# Patient Record
Sex: FEMALE | Race: ASIAN | NOT HISPANIC OR LATINO | URBAN - METROPOLITAN AREA
[De-identification: names, ages, dates, MRNs, and addresses within clinical notes are randomized per-mention and may not be internally consistent; named-entity substitution may affect disease eponyms.]

---

## 2022-03-13 ENCOUNTER — HOSPITAL ENCOUNTER (EMERGENCY)
Facility: HOSPITAL | Age: 67
Discharge: HOME | End: 2022-03-14
Attending: EMERGENCY MEDICINE | Admitting: EMERGENCY MEDICINE

## 2022-03-13 ENCOUNTER — APPOINTMENT (OUTPATIENT)
Dept: CT IMAGING | Facility: HOSPITAL | Age: 67
End: 2022-03-13

## 2022-03-13 DIAGNOSIS — N20.1 RIGHT URETERAL STONE: Primary | ICD-10-CM

## 2022-03-13 LAB
ALBUMIN SERPL-MCNC: 4.1 G/DL (ref 3.5–5.2)
ALBUMIN/GLOB SERPL: 1.3 G/DL
ALP SERPL-CCNC: 106 U/L (ref 39–117)
ALT SERPL W P-5'-P-CCNC: 15 U/L (ref 1–33)
ANION GAP SERPL CALCULATED.3IONS-SCNC: 12 MMOL/L (ref 5–15)
AST SERPL-CCNC: 18 U/L (ref 1–32)
BACTERIA UR QL AUTO: ABNORMAL /HPF
BASOPHILS # BLD AUTO: 0 10*3/MM3 (ref 0–0.2)
BASOPHILS NFR BLD AUTO: 0.3 % (ref 0–1.5)
BILIRUB SERPL-MCNC: 0.3 MG/DL (ref 0–1.2)
BILIRUB UR QL STRIP: NEGATIVE
BUN SERPL-MCNC: 15 MG/DL (ref 8–23)
BUN/CREAT SERPL: 27.8 (ref 7–25)
CALCIUM SPEC-SCNC: 9.6 MG/DL (ref 8.6–10.5)
CHLORIDE SERPL-SCNC: 101 MMOL/L (ref 98–107)
CLARITY UR: ABNORMAL
CO2 SERPL-SCNC: 26 MMOL/L (ref 22–29)
COLOR UR: YELLOW
CREAT SERPL-MCNC: 0.54 MG/DL (ref 0.57–1)
DEPRECATED RDW RBC AUTO: 39.4 FL (ref 37–54)
EGFRCR SERPLBLD CKD-EPI 2021: 101.7 ML/MIN/1.73
EOSINOPHIL # BLD AUTO: 0 10*3/MM3 (ref 0–0.4)
EOSINOPHIL NFR BLD AUTO: 0.1 % (ref 0.3–6.2)
ERYTHROCYTE [DISTWIDTH] IN BLOOD BY AUTOMATED COUNT: 13 % (ref 12.3–15.4)
GLOBULIN UR ELPH-MCNC: 3.2 GM/DL
GLUCOSE SERPL-MCNC: 220 MG/DL (ref 65–99)
GLUCOSE UR STRIP-MCNC: ABNORMAL MG/DL
HCT VFR BLD AUTO: 39.3 % (ref 34–46.6)
HGB BLD-MCNC: 13.1 G/DL (ref 12–15.9)
HGB UR QL STRIP.AUTO: ABNORMAL
HYALINE CASTS UR QL AUTO: ABNORMAL /LPF
KETONES UR QL STRIP: ABNORMAL
LEUKOCYTE ESTERASE UR QL STRIP.AUTO: ABNORMAL
LIPASE SERPL-CCNC: 22 U/L (ref 13–60)
LYMPHOCYTES # BLD AUTO: 0.7 10*3/MM3 (ref 0.7–3.1)
LYMPHOCYTES NFR BLD AUTO: 7.8 % (ref 19.6–45.3)
MCH RBC QN AUTO: 28.7 PG (ref 26.6–33)
MCHC RBC AUTO-ENTMCNC: 33.2 G/DL (ref 31.5–35.7)
MCV RBC AUTO: 86.5 FL (ref 79–97)
MONOCYTES # BLD AUTO: 0.3 10*3/MM3 (ref 0.1–0.9)
MONOCYTES NFR BLD AUTO: 3.1 % (ref 5–12)
NEUTROPHILS NFR BLD AUTO: 7.9 10*3/MM3 (ref 1.7–7)
NEUTROPHILS NFR BLD AUTO: 88.7 % (ref 42.7–76)
NITRITE UR QL STRIP: NEGATIVE
NRBC BLD AUTO-RTO: 0.1 /100 WBC (ref 0–0.2)
PH UR STRIP.AUTO: 8 [PH] (ref 5–8)
PLATELET # BLD AUTO: 217 10*3/MM3 (ref 140–450)
PMV BLD AUTO: 7.5 FL (ref 6–12)
POTASSIUM SERPL-SCNC: 3.8 MMOL/L (ref 3.5–5.2)
PROT SERPL-MCNC: 7.3 G/DL (ref 6–8.5)
PROT UR QL STRIP: ABNORMAL
RBC # BLD AUTO: 4.54 10*6/MM3 (ref 3.77–5.28)
RBC # UR STRIP: ABNORMAL /HPF
REF LAB TEST METHOD: ABNORMAL
SODIUM SERPL-SCNC: 139 MMOL/L (ref 136–145)
SP GR UR STRIP: 1.01 (ref 1–1.03)
SQUAMOUS #/AREA URNS HPF: ABNORMAL /HPF
UROBILINOGEN UR QL STRIP: ABNORMAL
WBC # UR STRIP: ABNORMAL /HPF
WBC NRBC COR # BLD: 8.9 10*3/MM3 (ref 3.4–10.8)

## 2022-03-13 PROCEDURE — 87086 URINE CULTURE/COLONY COUNT: CPT | Performed by: EMERGENCY MEDICINE

## 2022-03-13 PROCEDURE — 83690 ASSAY OF LIPASE: CPT | Performed by: EMERGENCY MEDICINE

## 2022-03-13 PROCEDURE — 74177 CT ABD & PELVIS W/CONTRAST: CPT

## 2022-03-13 PROCEDURE — 99283 EMERGENCY DEPT VISIT LOW MDM: CPT

## 2022-03-13 PROCEDURE — 81001 URINALYSIS AUTO W/SCOPE: CPT | Performed by: EMERGENCY MEDICINE

## 2022-03-13 PROCEDURE — 85025 COMPLETE CBC W/AUTO DIFF WBC: CPT | Performed by: EMERGENCY MEDICINE

## 2022-03-13 PROCEDURE — 80053 COMPREHEN METABOLIC PANEL: CPT | Performed by: EMERGENCY MEDICINE

## 2022-03-14 VITALS
HEART RATE: 64 BPM | DIASTOLIC BLOOD PRESSURE: 72 MMHG | OXYGEN SATURATION: 96 % | WEIGHT: 125 LBS | HEIGHT: 60 IN | RESPIRATION RATE: 20 BRPM | SYSTOLIC BLOOD PRESSURE: 133 MMHG | BODY MASS INDEX: 24.54 KG/M2 | TEMPERATURE: 97.4 F

## 2022-03-14 PROCEDURE — 25010000002 KETOROLAC TROMETHAMINE PER 15 MG: Performed by: EMERGENCY MEDICINE

## 2022-03-14 PROCEDURE — 0 IOPAMIDOL PER 1 ML: Performed by: EMERGENCY MEDICINE

## 2022-03-14 PROCEDURE — 96374 THER/PROPH/DIAG INJ IV PUSH: CPT

## 2022-03-14 PROCEDURE — 96361 HYDRATE IV INFUSION ADD-ON: CPT

## 2022-03-14 RX ORDER — KETOROLAC TROMETHAMINE 15 MG/ML
15 INJECTION, SOLUTION INTRAMUSCULAR; INTRAVENOUS ONCE
Status: COMPLETED | OUTPATIENT
Start: 2022-03-14 | End: 2022-03-14

## 2022-03-14 RX ORDER — ONDANSETRON 4 MG/1
4 TABLET, FILM COATED ORAL EVERY 8 HOURS PRN
Qty: 20 TABLET | Refills: 0 | Status: SHIPPED | OUTPATIENT
Start: 2022-03-14

## 2022-03-14 RX ORDER — HYDROCODONE BITARTRATE AND ACETAMINOPHEN 5; 325 MG/1; MG/1
1 TABLET ORAL EVERY 4 HOURS PRN
Qty: 24 TABLET | Refills: 0 | Status: SHIPPED | OUTPATIENT
Start: 2022-03-14

## 2022-03-14 RX ORDER — TAMSULOSIN HYDROCHLORIDE 0.4 MG/1
1 CAPSULE ORAL DAILY
Qty: 30 CAPSULE | Refills: 0 | Status: SHIPPED | OUTPATIENT
Start: 2022-03-14

## 2022-03-14 RX ADMIN — KETOROLAC TROMETHAMINE 15 MG: 15 INJECTION, SOLUTION INTRAMUSCULAR; INTRAVENOUS at 01:34

## 2022-03-14 RX ADMIN — SODIUM CHLORIDE 1000 ML: 9 INJECTION, SOLUTION INTRAVENOUS at 01:33

## 2022-03-14 RX ADMIN — IOPAMIDOL 80 ML: 755 INJECTION, SOLUTION INTRAVENOUS at 00:19

## 2022-03-14 NOTE — ED PROVIDER NOTES
Subjective   66-year-old female with onset of severe right lower quadrant pain while playing slot machines at the [employer] at 7 PM.  Patient took some Kaopectate with little relief but does state currently the pain is moderate and tolerable.  No associated symptoms, no aggravating limiting factors.          Review of Systems   Gastrointestinal: Positive for abdominal pain.   All other systems reviewed and are negative.      No past medical history on file.    Allergies   Allergen Reactions   • Aspirin Unknown - High Severity       No past surgical history on file.    No family history on file.    Social History     Socioeconomic History   • Marital status: Single           Objective   Physical Exam  Constitutional:       Appearance: She is well-developed.   HENT:      Head: Normocephalic and atraumatic.      Mouth/Throat:      Mouth: Mucous membranes are moist.      Pharynx: Oropharynx is clear.   Eyes:      Conjunctiva/sclera: Conjunctivae normal.      Pupils: Pupils are equal, round, and reactive to light.   Cardiovascular:      Rate and Rhythm: Normal rate and regular rhythm.   Pulmonary:      Effort: Pulmonary effort is normal.      Breath sounds: Normal breath sounds.   Abdominal:      General: Bowel sounds are normal. There is no distension.      Palpations: Abdomen is soft.      Tenderness: There is no abdominal tenderness.   Musculoskeletal:         General: Normal range of motion.   Skin:     General: Skin is warm and dry.      Capillary Refill: Capillary refill takes less than 2 seconds.   Neurological:      General: No focal deficit present.      Mental Status: She is alert and oriented to person, place, and time.   Psychiatric:         Mood and Affect: Mood normal.         Behavior: Behavior normal.         Procedures           ED Course                                                 MDM  Number of Diagnoses or Management Options  Right ureteral stone  Diagnosis management comments: Results for orders  placed or performed during the hospital encounter of 03/13/22  -Comprehensive Metabolic Panel:   Specimen: Blood       Result                      Value             Ref Range           Glucose                     220 (H)           65 - 99 mg/dL       BUN                         15                8 - 23 mg/dL        Creatinine                  0.54 (L)          0.57 - 1.00 *       Sodium                      139               136 - 145 mm*       Potassium                   3.8               3.5 - 5.2 mm*       Chloride                    101               98 - 107 mmo*       CO2                         26.0              22.0 - 29.0 *       Calcium                     9.6               8.6 - 10.5 m*       Total Protein               7.3               6.0 - 8.5 g/*       Albumin                     4.10              3.50 - 5.20 *       ALT (SGPT)                  15                1 - 33 U/L          AST (SGOT)                  18                1 - 32 U/L          Alkaline Phosphatase        106               39 - 117 U/L        Total Bilirubin             0.3               0.0 - 1.2 mg*       Globulin                    3.2               gm/dL               A/G Ratio                   1.3               g/dL                BUN/Creatinine Ratio        27.8 (H)          7.0 - 25.0          Anion Gap                   12.0              5.0 - 15.0 m*       eGFR                        101.7             >60.0 mL/min*  -Lipase:   Specimen: Blood       Result                      Value             Ref Range           Lipase                      22                13 - 60 U/L    -Urinalysis With Culture If Indicated - Urine, Clean Catch:   Specimen: Urine, Clean Catch       Result                      Value             Ref Range           Color, UA                   Yellow            Yellow, Straw       Appearance, UA              Cloudy (A)        Clear               pH, UA                      8.0               5.0 - 8.0            Specific [place], UA        1.013             1.005 - 1.030       Glucose, UA                                   Negative        250 mg/dL (1+) (A)       Ketones, UA                 Trace (A)         Negative            Bilirubin, UA               Negative          Negative            Blood, UA                                     Negative        Large (3+) (A)       Protein, UA                 Trace (A)         Negative            Leuk Esterase, UA           Trace (A)         Negative            Nitrite, UA                 Negative          Negative            Urobilinogen, UA            1.0 E.U./dL       0.2 - 1.0 E.*  -CBC Auto Differential:   Specimen: Blood       Result                      Value             Ref Range           WBC                         8.90              3.40 - 10.80*       RBC                         4.54              3.77 - 5.28 *       Hemoglobin                  13.1              12.0 - 15.9 *       Hematocrit                  39.3              34.0 - 46.6 %       MCV                         86.5              79.0 - 97.0 *       MCH                         28.7              26.6 - 33.0 *       MCHC                        33.2              31.5 - 35.7 *       RDW                         13.0              12.3 - 15.4 %       RDW-SD                      39.4              37.0 - 54.0 *       MPV                         7.5               6.0 - 12.0 fL       Platelets                   217               140 - 450 10*       Neutrophil %                88.7 (H)          42.7 - 76.0 %       Lymphocyte %                7.8 (L)           19.6 - 45.3 %       Monocyte %                  3.1 (L)           5.0 - 12.0 %        Eosinophil %                0.1 (L)           0.3 - 6.2 %         Basophil %                  0.3               0.0 - 1.5 %         Neutrophils, Absolute       7.90 (H)          1.70 - 7.00 *       Lymphocytes, Absolute       0.70              0.70 - 3.10 *       Monocytes,  Absolute         0.30              0.10 - 0.90 *       Eosinophils, Absolute       0.00              0.00 - 0.40 *       Basophils, Absolute         0.00              0.00 - 0.20 *       nRBC                        0.1               0.0 - 0.2 /1*  -Urinalysis, Microscopic Only - Urine, Clean Catch:   Specimen: Urine, Clean Catch       Result                      Value             Ref Range           RBC, UA                                       None Seen /H*   Too Numerous to Count (A)       WBC, UA                     6-12 (A)          None Seen /H*       Bacteria, UA                None Seen         None Seen /H*       Squamous Epithelial Ce*     0-2               None Seen, 0*       Hyaline Casts, UA           None Seen         None Seen /L*       Methodology                                                   Automated Microscopy  CT Abdomen Pelvis With Contrast   Final Result        1.  3 mm stone within the distal right ureter. No hydronephrosis. A small amount of right perinephric fluid is present.                Electronically signed by:  Tapan Harmon DO      3/13/2022 10:52 PM       Patient appears well, pain mild, patient's aspirin intolerance is qualified is a history of associated subconjunctival hemorrhage, no allergy.  Inspect reviewed.  Patient is traveling back to [place] tomorrow, will follow up with her PCP for urology referral.       Amount and/or Complexity of Data Reviewed  Clinical lab tests: ordered and reviewed  Tests in the radiology section of CPT®: ordered and reviewed  Tests in the medicine section of CPT®: reviewed and ordered        Final diagnoses:   Right ureteral stone       ED Disposition  ED Disposition     ED Disposition   Discharge    Condition   Stable    Comment   --               Follow-up with your family doctor in [place] for urologist referral             Medication List      New Prescriptions    HYDROcodone-acetaminophen 5-325 MG per tablet  Commonly known as: NORCO  Take 1 tablet  by mouth Every 4 (Four) Hours As Needed for Moderate Pain .     ondansetron 4 MG tablet  Commonly known as: ZOFRAN  Take 1 tablet by mouth Every 8 (Eight) Hours As Needed for Nausea or Vomiting.     tamsulosin 0.4 MG capsule 24 hr capsule  Commonly known as: FLOMAX  Take 1 capsule by mouth Daily.           Where to Get Your Medications      These medications were sent to [facility]/pharmacy #[id number] - [place], [place] - [address] [place] [address] [phone] [place] [id number] [address] [place] [address]    Hours: 24-hours Phone: [phone]   · HYDROcodone-acetaminophen 5-325 MG per tablet  · ondansetron 4 MG tablet  · tamsulosin 0.4 MG capsule 24 hr capsule          Kieran Briggs MD  03/14/22 0132

## 2022-03-14 NOTE — ED NOTES
Pt A&Ox 4 with lungs CTA bases. Pt ambulatory at this time.   Discharge instruction given to pt with verbal understanding received.   Pt discharged with education, RX, and personal belongings.

## 2022-03-14 NOTE — ED NOTES
Pt states ab pain began 2 hours ago in [facility]. No N/V. No other symptoms. Pt diabetic. States BG always high. Over 200 per EMS.

## 2022-03-15 LAB — BACTERIA SPEC AEROBE CULT: NORMAL
